# Patient Record
Sex: MALE | Race: WHITE | NOT HISPANIC OR LATINO | URBAN - METROPOLITAN AREA
[De-identification: names, ages, dates, MRNs, and addresses within clinical notes are randomized per-mention and may not be internally consistent; named-entity substitution may affect disease eponyms.]

---

## 2018-11-21 PROBLEM — Z00.00 ENCOUNTER FOR PREVENTIVE HEALTH EXAMINATION: Status: ACTIVE | Noted: 2018-11-21

## 2018-11-26 ENCOUNTER — OUTPATIENT (OUTPATIENT)
Dept: OUTPATIENT SERVICES | Facility: HOSPITAL | Age: 63
LOS: 1 days | End: 2018-11-26
Payer: COMMERCIAL

## 2018-11-26 ENCOUNTER — APPOINTMENT (OUTPATIENT)
Dept: CT IMAGING | Facility: HOSPITAL | Age: 63
End: 2018-11-26
Payer: SELF-PAY

## 2018-11-26 PROCEDURE — 75574 CT ANGIO HRT W/3D IMAGE: CPT | Mod: 26

## 2018-11-26 PROCEDURE — 75574 CT ANGIO HRT W/3D IMAGE: CPT

## 2019-05-14 ENCOUNTER — FORM ENCOUNTER (OUTPATIENT)
Age: 64
End: 2019-05-14

## 2019-05-15 ENCOUNTER — APPOINTMENT (OUTPATIENT)
Dept: ORTHOPEDIC SURGERY | Facility: CLINIC | Age: 64
End: 2019-05-15
Payer: COMMERCIAL

## 2019-05-15 ENCOUNTER — OUTPATIENT (OUTPATIENT)
Dept: OUTPATIENT SERVICES | Facility: HOSPITAL | Age: 64
LOS: 1 days | End: 2019-05-15
Payer: COMMERCIAL

## 2019-05-15 VITALS — WEIGHT: 152 LBS | BODY MASS INDEX: 24.43 KG/M2 | HEIGHT: 66 IN

## 2019-05-15 DIAGNOSIS — M23.92 UNSPECIFIED INTERNAL DERANGEMENT OF LEFT KNEE: ICD-10-CM

## 2019-05-15 DIAGNOSIS — M25.562 PAIN IN LEFT KNEE: ICD-10-CM

## 2019-05-15 PROCEDURE — 73562 X-RAY EXAM OF KNEE 3: CPT

## 2019-05-15 PROCEDURE — 99203 OFFICE O/P NEW LOW 30 MIN: CPT

## 2019-05-15 PROCEDURE — 73562 X-RAY EXAM OF KNEE 3: CPT | Mod: 26,50

## 2019-05-15 RX ORDER — MELOXICAM 7.5 MG/1
7.5 TABLET ORAL DAILY
Qty: 60 | Refills: 0 | Status: ACTIVE | COMMUNITY
Start: 2019-05-15 | End: 1900-01-01

## 2019-05-16 PROBLEM — M25.562 ACUTE PAIN OF LEFT KNEE: Status: ACTIVE | Noted: 2019-05-15

## 2019-05-16 PROBLEM — M23.92 INTERNAL DERANGEMENT OF LEFT KNEE: Status: ACTIVE | Noted: 2019-05-16

## 2019-05-16 NOTE — HISTORY OF PRESENT ILLNESS
[de-identified] : This is a 63-year-old male with a 2 month history of left knee pain. Patient states that he has a dull knee pain around the medial region as well as tenderness to palpation. He also has increased pain when he stands for excessive use of time and walks. He states that he does not have any low back pain or hip pain associated with this. He denies any trauma that may have escalated this issue. He states he has tried Aleve which has helped a little of the pain. But he has not had any injections or any other source of treatment for this.

## 2019-05-16 NOTE — ASSESSMENT
[FreeTextEntry1] : Assessment: This is a 63-year-old male with left knee pain most likely a meniscal pathology\par Plan: We are going to send him a prescription for Mobic to his pharmacy to help with the inflammation. We are also going to write him a prescription for physical therapy and given some home exercises to do. He is also going to get an MRI to rule out a meniscal tear. He is going to followup after the MRI.....

## 2019-05-20 ENCOUNTER — APPOINTMENT (OUTPATIENT)
Dept: MRI IMAGING | Facility: CLINIC | Age: 64
End: 2019-05-20

## 2019-05-28 ENCOUNTER — APPOINTMENT (OUTPATIENT)
Dept: ORTHOPEDIC SURGERY | Facility: CLINIC | Age: 64
End: 2019-05-28

## 2019-06-03 ENCOUNTER — OUTPATIENT (OUTPATIENT)
Dept: OUTPATIENT SERVICES | Facility: HOSPITAL | Age: 64
LOS: 1 days | End: 2019-06-03

## 2019-06-03 ENCOUNTER — APPOINTMENT (OUTPATIENT)
Dept: MRI IMAGING | Facility: CLINIC | Age: 64
End: 2019-06-03
Payer: COMMERCIAL

## 2019-06-03 PROCEDURE — 73721 MRI JNT OF LWR EXTRE W/O DYE: CPT | Mod: 26,LT

## 2024-09-18 ENCOUNTER — NON-APPOINTMENT (OUTPATIENT)
Age: 69
End: 2024-09-18

## 2024-09-19 ENCOUNTER — APPOINTMENT (OUTPATIENT)
Dept: OTOLARYNGOLOGY | Facility: CLINIC | Age: 69
End: 2024-09-19
Payer: MEDICARE

## 2024-09-19 VITALS
BODY MASS INDEX: 24.43 KG/M2 | TEMPERATURE: 98.7 F | SYSTOLIC BLOOD PRESSURE: 124 MMHG | WEIGHT: 152 LBS | DIASTOLIC BLOOD PRESSURE: 76 MMHG | OXYGEN SATURATION: 96 % | HEART RATE: 70 BPM | HEIGHT: 66 IN

## 2024-09-19 DIAGNOSIS — Z78.9 OTHER SPECIFIED HEALTH STATUS: ICD-10-CM

## 2024-09-19 DIAGNOSIS — K21.9 GASTRO-ESOPHAGEAL REFLUX DISEASE W/OUT ESOPHAGITIS: ICD-10-CM

## 2024-09-19 DIAGNOSIS — J02.9 ACUTE PHARYNGITIS, UNSPECIFIED: ICD-10-CM

## 2024-09-19 DIAGNOSIS — J38.3 OTHER DISEASES OF VOCAL CORDS: ICD-10-CM

## 2024-09-19 DIAGNOSIS — R09.82 POSTNASAL DRIP: ICD-10-CM

## 2024-09-19 DIAGNOSIS — R49.0 DYSPHONIA: ICD-10-CM

## 2024-09-19 DIAGNOSIS — R05.9 COUGH, UNSPECIFIED: ICD-10-CM

## 2024-09-19 PROCEDURE — 99205 OFFICE O/P NEW HI 60 MIN: CPT | Mod: 25

## 2024-09-19 PROCEDURE — 31579 LARYNGOSCOPY TELESCOPIC: CPT

## 2024-09-19 PROCEDURE — 70371 SPEECH EVALUATION COMPLEX: CPT | Mod: 59

## 2024-09-19 RX ORDER — OMEPRAZOLE 40 MG/1
40 CAPSULE, DELAYED RELEASE ORAL
Qty: 30 | Refills: 3 | Status: ACTIVE | COMMUNITY
Start: 2024-09-19 | End: 1900-01-01

## 2024-09-19 RX ORDER — FLUTICASONE PROPIONATE 50 UG/1
50 SPRAY, METERED NASAL DAILY
Qty: 1 | Refills: 1 | Status: ACTIVE | COMMUNITY
Start: 2024-09-19 | End: 1900-01-01

## 2024-09-19 RX ORDER — BENZONATATE 100 MG/1
100 CAPSULE ORAL
Qty: 30 | Refills: 0 | Status: ACTIVE | COMMUNITY
Start: 2024-09-19 | End: 1900-01-01

## 2024-09-19 RX ORDER — OMEPRAZOLE 40 MG/1
40 CAPSULE, DELAYED RELEASE ORAL
Refills: 0 | Status: ACTIVE | COMMUNITY

## 2024-09-19 RX ORDER — FAMOTIDINE 20 MG/1
20 TABLET, FILM COATED ORAL DAILY
Qty: 30 | Refills: 3 | Status: ACTIVE | COMMUNITY
Start: 2024-09-19 | End: 1900-01-01

## 2024-09-19 RX ORDER — HYDROCHLOROTHIAZIDE 12.5 MG/1
12.5 CAPSULE ORAL
Refills: 0 | Status: ACTIVE | COMMUNITY

## 2024-09-19 NOTE — PROCEDURE
[de-identified] : -   Pre-operative Diagnosis: Dysphonia, Sore throat  Post-operative Diagnosis: laryngopharyngeal reflux, left vc granuloma Anesthesia: Topical - 1 % Lidocaine/Phenylephrine Procedure: Flexible Laryngoscopy with Stroboscopy - [facility] [address]   Procedure Details: The patient was placed in the sitting position. After decongestant and anesthesia were applied the laryngoscope was passed. The nasal cavities, nasopharynx, oropharynx, hypopharynx, and larynx were all examined. Vocal folds were examined during respiration and phonation. The following findings were noted:   Findings: Nose: Septum is midline, turbinates are normal, nasal airways patent, mucosa normal Nasopharynx: Adenoids normal, no masses, eustachian tube normal Oropharynx: Pharyngeal walls symmetric and without lesion. Tonsils/fossae symmetric Hypopharynx: Hypopharynx and pyriform sinuses without lesion. No masses or asymmetry. Larynx: Epiglottis and aryepiglottic folds were sharp and crisp bilaterally. Bilateral false vocal folds normal appearance. Airway was widely patent. + postcricoid edema, erythema of the vocal process,  left vc granuloma   Strobe Exam Ratings   TVF Appearance: normal, mild erythema of the vocal process,  left vc granuloma TVF Mobility: normal Edema/hypertrophy: normal, + postcricoid edema Mucus on TVF: normal Glottic Closure: normal/adequate Mucosal Wave: normal Amplitude of Vibration: normal Phase: symmetric Supraglottic Hyperfunction: none Other Findings: none   Condition: Stable. Patient tolerated procedure well.   Complications: None    --------------------------------------------------------------------   Procedure: Pharyngeal and speech evaluation, by cine or video - CPT 49108 Voice assessment was recorded via video recording on this date.   Clarity: Reduced Range: Reduced Pitch Control: Reduced Projection: Reduced Tremor: None Cough: +   Condition: Stable. Patient tolerated procedure well. Complications: None.

## 2024-09-19 NOTE — PHYSICAL EXAM
[Normal] : lingual tonsils are normal [Midline] : trachea located in midline position [FreeTextEntry1] : voice mildly hoarse, good projection and range  [de-identified] : erythematous

## 2024-09-19 NOTE — ASSESSMENT
[FreeTextEntry1] : - 9/19/24: 69 y/o M presents with sore throat and cough for the past 3-4 weeks. He reports cough is mostly dry, multiple times per day, worse when he lays down, can also have coughing fits throughout the day.  He feels his voice is more hoarse. Based on history and physical exam, I do believe there is a component of laryngopharyngeal reflux and left vc granuloma. At this time I am recommending dietary and behavioral change to reduce acid in the diet. I am also recommending omeprazole as well famotidine for a 3 months trial. I am also recommending cough avoidance and Tessalon Perles. Follow up in 3 months.   For postnasal drip I recommended nasal saline rinses and nasal steroids.   -Tessalon Perles -Dietary and behavioral modification to reduce acid reflux, handout given -Omeprazole qd as well as Famotidine qhs -Voice hygiene, increase hydration, sips of water throughout the day, avoid throat clearing, cough avoidance  -nasal saline rinses and nasal steroids  -Follow up in 3 months

## 2024-09-19 NOTE — PROCEDURE
[de-identified] : -   Pre-operative Diagnosis: Dysphonia, Sore throat  Post-operative Diagnosis: laryngopharyngeal reflux, left vc granuloma Anesthesia: Topical - 1 % Lidocaine/Phenylephrine Procedure: Flexible Laryngoscopy with Stroboscopy - [facility] [address]   Procedure Details: The patient was placed in the sitting position. After decongestant and anesthesia were applied the laryngoscope was passed. The nasal cavities, nasopharynx, oropharynx, hypopharynx, and larynx were all examined. Vocal folds were examined during respiration and phonation. The following findings were noted:   Findings: Nose: Septum is midline, turbinates are normal, nasal airways patent, mucosa normal Nasopharynx: Adenoids normal, no masses, eustachian tube normal Oropharynx: Pharyngeal walls symmetric and without lesion. Tonsils/fossae symmetric Hypopharynx: Hypopharynx and pyriform sinuses without lesion. No masses or asymmetry. Larynx: Epiglottis and aryepiglottic folds were sharp and crisp bilaterally. Bilateral false vocal folds normal appearance. Airway was widely patent. + postcricoid edema, erythema of the vocal process,  left vc granuloma   Strobe Exam Ratings   TVF Appearance: normal, mild erythema of the vocal process,  left vc granuloma TVF Mobility: normal Edema/hypertrophy: normal, + postcricoid edema Mucus on TVF: normal Glottic Closure: normal/adequate Mucosal Wave: normal Amplitude of Vibration: normal Phase: symmetric Supraglottic Hyperfunction: none Other Findings: none   Condition: Stable. Patient tolerated procedure well.   Complications: None    --------------------------------------------------------------------   Procedure: Pharyngeal and speech evaluation, by cine or video - CPT 89058 Voice assessment was recorded via video recording on this date.   Clarity: Reduced Range: Reduced Pitch Control: Reduced Projection: Reduced Tremor: None Cough: +   Condition: Stable. Patient tolerated procedure well. Complications: None.

## 2024-09-19 NOTE — HISTORY OF PRESENT ILLNESS
[de-identified] : 9/19/24: 67 y/o M presents with sore throat and cough for the past 3-4 weeks. He reports cough is mostly dry, multiple times per day, worse when he lays down, can also have coughing fits throughout the day. No issues eating, chewing, or swallowing. No issues breathing or regurgitation of food. He feels his voice is more hoarse. Works as a , significant voice demands. He is taking Tylenol cold/ flu, steam inhaler. He was started on Omeprazole 40 mg by internist for burping, taking it for 1 month and notes mild improvement with this.   Diet: +coffee, mint, tomato, citrus, garlic, onion  -tea, soda, chocolate, tomato, blueberry, spicy food, carbonated beverages water intake: 5- 6 glasses  late night eating: no  He has intermittent postnasal drip which started today.

## 2024-09-19 NOTE — HISTORY OF PRESENT ILLNESS
[de-identified] : 9/19/24: 69 y/o M presents with sore throat and cough for the past 3-4 weeks. He reports cough is mostly dry, multiple times per day, worse when he lays down, can also have coughing fits throughout the day. No issues eating, chewing, or swallowing. No issues breathing or regurgitation of food. He feels his voice is more hoarse. Works as a , significant voice demands. He is taking Tylenol cold/ flu, steam inhaler. He was started on Omeprazole 40 mg by internist for burping, taking it for 1 month and notes mild improvement with this.   Diet: +coffee, mint, tomato, citrus, garlic, onion  -tea, soda, chocolate, tomato, blueberry, spicy food, carbonated beverages water intake: 5- 6 glasses  late night eating: no  He has intermittent postnasal drip which started today.

## 2024-09-19 NOTE — PHYSICAL EXAM
[Normal] : lingual tonsils are normal [Midline] : trachea located in midline position [FreeTextEntry1] : voice mildly hoarse, good projection and range  [de-identified] : erythematous